# Patient Record
Sex: MALE | Race: WHITE | Employment: OTHER | URBAN - METROPOLITAN AREA
[De-identification: names, ages, dates, MRNs, and addresses within clinical notes are randomized per-mention and may not be internally consistent; named-entity substitution may affect disease eponyms.]

---

## 2019-08-15 ENCOUNTER — ANCILLARY PROCEDURE (OUTPATIENT)
Dept: GENERAL RADIOLOGY | Facility: CLINIC | Age: 39
End: 2019-08-15
Attending: FAMILY MEDICINE
Payer: COMMERCIAL

## 2019-08-15 ENCOUNTER — OFFICE VISIT (OUTPATIENT)
Dept: ORTHOPEDICS | Facility: CLINIC | Age: 39
End: 2019-08-15
Payer: COMMERCIAL

## 2019-08-15 VITALS — HEIGHT: 73 IN | BODY MASS INDEX: 29.37 KG/M2 | WEIGHT: 221.6 LBS

## 2019-08-15 DIAGNOSIS — M25.562 CHRONIC PAIN OF LEFT KNEE: Primary | ICD-10-CM

## 2019-08-15 DIAGNOSIS — G89.29 CHRONIC PAIN OF LEFT KNEE: ICD-10-CM

## 2019-08-15 DIAGNOSIS — G89.29 CHRONIC PAIN OF LEFT KNEE: Primary | ICD-10-CM

## 2019-08-15 DIAGNOSIS — M25.562 CHRONIC PAIN OF LEFT KNEE: ICD-10-CM

## 2019-08-15 PROCEDURE — 73564 X-RAY EXAM KNEE 4 OR MORE: CPT | Mod: LT | Performed by: RADIOLOGY

## 2019-08-15 PROCEDURE — 99213 OFFICE O/P EST LOW 20 MIN: CPT | Performed by: FAMILY MEDICINE

## 2019-08-15 ASSESSMENT — MIFFLIN-ST. JEOR: SCORE: 1995.79

## 2019-08-15 NOTE — LETTER
"    8/15/2019         RE: Babar Weber  [address] [facility] [address]        Dear Colleague,    Thank you for referring your patient, Babar Weber, to the [facility]. Please see a copy of my visit note below.          [facility]  8/15/2019    Babar Weber's chief complaint for this visit includes:  Chief Complaint   Patient presents with     Consult     left knee pain, possibel injury during volleyball in the spring, feels like it hyperextends,      PCP: No Ref-Primary, Physician    Referring Provider:  No referring provider defined for this encounter.    Ht 1.865 m (6' 1.43\")   Wt 100.5 kg (221 lb 9.6 oz)   BMI 28.90 kg/m     Data Unavailable       Reason for visit:     What part of your body is injured / painful?  left knee    What caused the injury /pain? Recreational/competitive sports injury -     How long ago did your injury occur or pain begin? several months ago    What are your most bothersome symptoms? Pain    How would you characterize your symptom?  aching    What makes your symptoms better? Nothing    What makes your symptoms worse? Movement    Have you been previously seen for this problem? No    Medical History:    Any recent changes to your medical history? No    Any new medication prescribed since last visit? No    Have you had surgery on this body part before? No    Social History:    Occupation: Construction     Handedness: Right    Review of Systems:    Do you have fever, chills, weight loss? No    Do you have any vision problems? No    Do you have any chest pain or edema? No    Do you have any shortness of breath or wheezing?  No    Do you have stomach problems? No    Do you have any numbness or focal weakness? No    Do you have diabetes? No    Do you have problems with bleeding or clotting? No    Do you have an rashes or other skin lesions? No       CHIEF COMPLAINT:  Consult (left knee pain, possibel injury during volleyball in the spring, " "feels like it hyperextends, )       HISTORY OF PRESENT ILLNESS  Mr. Weber is a pleasant 38 year old year old male who presents to clinic today with left knee pain.  Babar has had left knee pain since the spring.  He noticed pain after a volleyball game, although no acute inciting event that he can recall.  He feels pain inside the knee, vague, achy, sharp at times.  He denies any swelling.  Pain is intermittent, he will feel it when he is standing up from a squatted position.  Stairs do not hurt him.      Additional history: as documented    MEDICAL HISTORY  There is no problem list on file for this patient.      Current Outpatient Medications   Medication Sig Dispense Refill     NO ACTIVE MEDICATIONS          Allergies   Allergen Reactions     Ibuprofen Sodium Swelling       No family history on file.    Additional medical/Social/Surgical histories reviewed in EPIC and updated as appropriate.        PHYSICAL EXAM    Vitals:    08/15/19 0800   Weight: 100.5 kg (221 lb 9.6 oz)   Height: 1.865 m (6' 1.43\")     General  - normal appearance, in no obvious distress  CV  - normal popliteal pulse  Pulm  - normal respiratory pattern, non-labored  Musculoskeletal - left knee  - stance: normal gait without limp  - inspection: no swelling or effusion, normal bone and joint alignment, no obvious deformity  - palpation: no joint line tenderness, patella and patellar tendon non-tender  - ROM: 135 degrees flexion, -5 degrees extension, not painful, normal actively and passively compared to contralateral  - strength: 5/5 in flexion, 5/5 in extension  - special tests:  (-) Lachman  (-) Adilia, feels a click during loaded extension, no pain  (-) varus at 0 and 30 degrees flexion  (-) valgus at 0 and 30 degrees flexion    Neuro  - no sensory or motor deficit, grossly normal coordination, normal muscle tone  Skin  - no ecchymosis, erythema, warmth, or induration, no obvious rash  Psych  - interactive, appropriate, normal mood and " affect             ASSESSMENT & PLAN  Mr. Weber is a 38 year old year old male who presents to clinic today with left knee pain.    I ordered and reviewed an x-ray of his knee which shows no obvious issues.  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    We discussed strengthening techniques, I do think he would benefit from physical therapy.  He lives in [place], he is likely going to attend physical therapy at [place].    He does have a brace for volleyball, this is helping.  He should continue to wear this.    If he continues to do well we can follow-up as needed.    It was a pleasure seeing Babar today.    Washington Ruiz DO, [facility]  Primary Care Sports Medicine      This note was constructed using Dragon dictation software, please excuse any minor errors in spelling, grammar, or syntax.      Again, thank you for allowing me to participate in the care of your patient.        Sincerely,        Washington Ruiz DO

## 2019-08-15 NOTE — PROGRESS NOTES
"      [facility]  8/15/2019    Babar Weber's chief complaint for this visit includes:  Chief Complaint   Patient presents with     Consult     left knee pain, possibel injury during volleyball in the spring, feels like it hyperextends,      PCP: No Ref-Primary, Physician    Referring Provider:  No referring provider defined for this encounter.    Ht 1.865 m (6' 1.43\")   Wt 100.5 kg (221 lb 9.6 oz)   BMI 28.90 kg/m    Data Unavailable       Reason for visit:     What part of your body is injured / painful?  left knee    What caused the injury /pain? Recreational/competitive sports injury -     How long ago did your injury occur or pain begin? several months ago    What are your most bothersome symptoms? Pain    How would you characterize your symptom?  aching    What makes your symptoms better? Nothing    What makes your symptoms worse? Movement    Have you been previously seen for this problem? No    Medical History:    Any recent changes to your medical history? No    Any new medication prescribed since last visit? No    Have you had surgery on this body part before? No    Social History:    Occupation: Construction     Handedness: Right    Review of Systems:    Do you have fever, chills, weight loss? No    Do you have any vision problems? No    Do you have any chest pain or edema? No    Do you have any shortness of breath or wheezing?  No    Do you have stomach problems? No    Do you have any numbness or focal weakness? No    Do you have diabetes? No    Do you have problems with bleeding or clotting? No    Do you have an rashes or other skin lesions? No       CHIEF COMPLAINT:  Consult (left knee pain, possibel injury during volleyball in the spring, feels like it hyperextends, )       HISTORY OF PRESENT ILLNESS  Mr. Weber is a pleasant 38 year old year old male who presents to clinic today with left knee pain.  Babar has had left knee pain since the spring.  He noticed pain after a " "volleyball game, although no acute inciting event that he can recall.  He feels pain inside the knee, vague, achy, sharp at times.  He denies any swelling.  Pain is intermittent, he will feel it when he is standing up from a squatted position.  Stairs do not hurt him.      Additional history: as documented    MEDICAL HISTORY  There is no problem list on file for this patient.      Current Outpatient Medications   Medication Sig Dispense Refill     NO ACTIVE MEDICATIONS          Allergies   Allergen Reactions     Ibuprofen Sodium Swelling       No family history on file.    Additional medical/Social/Surgical histories reviewed in [facility] and updated as appropriate.        PHYSICAL EXAM    Vitals:    08/15/19 0800   Weight: 100.5 kg (221 lb 9.6 oz)   Height: 1.865 m (6' 1.43\")     General  - normal appearance, in no obvious distress  CV  - normal popliteal pulse  Pulm  - normal respiratory pattern, non-labored  Musculoskeletal - left knee  - stance: normal gait without limp  - inspection: no swelling or effusion, normal bone and joint alignment, no obvious deformity  - palpation: no joint line tenderness, patella and patellar tendon non-tender  - ROM: 135 degrees flexion, -5 degrees extension, not painful, normal actively and passively compared to contralateral  - strength: 5/5 in flexion, 5/5 in extension  - special tests:  (-) Lachman  (-) Adilia, feels a click during loaded extension, no pain  (-) varus at 0 and 30 degrees flexion  (-) valgus at 0 and 30 degrees flexion    Neuro  - no sensory or motor deficit, grossly normal coordination, normal muscle tone  Skin  - no ecchymosis, erythema, warmth, or induration, no obvious rash  Psych  - interactive, appropriate, normal mood and affect             ASSESSMENT & PLAN  Mr. Weber is a 38 year old year old male who presents to clinic today with left knee pain.    I ordered and reviewed an x-ray of his knee which shows no obvious issues.  He does have a bipartite " right patella.    We discussed strengthening techniques, I do think he would benefit from physical therapy.  He lives in [place], he is likely going to attend physical therapy at [place].    He does have a brace for volleyball, this is helping.  He should continue to wear this.    If he continues to do well we can follow-up as needed.    It was a pleasure seeing Babar today.    Washington Ruiz DO, [facility]  Primary Care Sports Medicine      This note was constructed using Dragon dictation software, please excuse any minor errors in spelling, grammar, or syntax.